# Patient Record
Sex: FEMALE | Race: WHITE | NOT HISPANIC OR LATINO | Employment: PART TIME | URBAN - METROPOLITAN AREA
[De-identification: names, ages, dates, MRNs, and addresses within clinical notes are randomized per-mention and may not be internally consistent; named-entity substitution may affect disease eponyms.]

---
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
## 2022-04-18 ENCOUNTER — TELEPHONE (OUTPATIENT)
Dept: OBGYN CLINIC | Facility: CLINIC | Age: 22
End: 2022-04-18

## 2022-04-18 NOTE — TELEPHONE ENCOUNTER
Angelica Huff left a message requesting Brand Name Elmasa Harpers 1 5/30  Spoke with [facility] staff whom confirmed, Angelica Huff has a 90 day refill remaining from 05/18/21 Rx    Informed Priscila of remaining refill and to schedule [place]  Skylar Hobson Transferred Angelica Huff to

## 2022-05-31 ENCOUNTER — TELEPHONE (OUTPATIENT)
Dept: OBGYN CLINIC | Facility: CLINIC | Age: 22
End: 2022-05-31

## 2022-05-31 DIAGNOSIS — Z30.41 ENCOUNTER FOR SURVEILLANCE OF CONTRACEPTIVE PILLS: ICD-10-CM

## 2022-05-31 RX ORDER — NORETHINDRONE ACETATE AND ETHINYL ESTRADIOL AND FERROUS FUMARATE 1.5-30(21)
1 KIT ORAL DAILY
Qty: 84 TABLET | Refills: 1 | Status: SHIPPED | OUTPATIENT
Start: 2022-05-31 | End: 2022-07-28 | Stop reason: SDUPTHER

## 2022-05-31 NOTE — TELEPHONE ENCOUNTER
[facility] called in requesting a refill on her Junel FE 1 5/30 to [facility] pharmacy in [place] on [address] [facility] [address] [facility] [address] [facility] scheduled 8/11/2022      Last seen on 4/30/2021 by Dr Stu Cartagena,    Dr Stu Cartagena,  Can you please provide courtesy refills until [facility] with Dr Nga Jackson on 8/11/2022

## 2022-07-28 ENCOUNTER — TELEPHONE (OUTPATIENT)
Dept: OBGYN CLINIC | Facility: CLINIC | Age: 22
End: 2022-07-28

## 2022-07-28 DIAGNOSIS — Z30.41 ENCOUNTER FOR SURVEILLANCE OF CONTRACEPTIVE PILLS: ICD-10-CM

## 2022-07-28 RX ORDER — NORETHINDRONE ACETATE AND ETHINYL ESTRADIOL AND FERROUS FUMARATE 1.5-30(21)
1 KIT ORAL DAILY
Qty: 84 TABLET | Refills: 0 | Status: SHIPPED | OUTPATIENT
Start: 2022-07-28

## 2022-07-28 NOTE — TELEPHONE ENCOUNTER
Received a fax from [facility] requesting a refill on Priscila's OCP Junel FE  She originally had a Well visit with Dr Brendan Spann in June but had to reschedule  She is seeing Dr Makenna Estevez on 8/11/2022  Her current pill pack will end prior to her visit on the 11th of august     She is in need of a refill to continue her medication      Last [facility] 4/2021 with Dr Brendan Spann,  Can you please refill

## 2022-08-11 ENCOUNTER — ANNUAL EXAM (OUTPATIENT)
Dept: OBGYN CLINIC | Facility: CLINIC | Age: 22
End: 2022-08-11
Payer: COMMERCIAL

## 2022-08-11 VITALS
WEIGHT: 273 LBS | BODY MASS INDEX: 41.37 KG/M2 | HEIGHT: 68 IN | SYSTOLIC BLOOD PRESSURE: 116 MMHG | DIASTOLIC BLOOD PRESSURE: 74 MMHG

## 2022-08-11 DIAGNOSIS — Z30.41 ENCOUNTER FOR SURVEILLANCE OF CONTRACEPTIVE PILLS: ICD-10-CM

## 2022-08-11 DIAGNOSIS — Z01.419 ENCNTR FOR GYN EXAM (GENERAL) (ROUTINE) W/O ABN FINDINGS: Primary | ICD-10-CM

## 2022-08-11 PROCEDURE — 99395 PREV VISIT EST AGE 18-39: CPT | Performed by: OBSTETRICS & GYNECOLOGY

## 2022-08-11 PROCEDURE — 0503F POSTPARTUM CARE VISIT: CPT | Performed by: OBSTETRICS & GYNECOLOGY

## 2022-08-11 RX ORDER — NORETHINDRONE ACETATE AND ETHINYL ESTRADIOL 1.5-30(21)
1 KIT ORAL DAILY
Qty: 84 TABLET | Refills: 3 | Status: SHIPPED | OUTPATIENT
Start: 2022-08-11 | End: 2022-11-03

## 2022-08-11 NOTE — PROGRESS NOTES
Annual Wellness Visit  [address], [address], [facility], [address]    ASSESSMENT/PLAN: Cheyanne Sousa is a 25 y o  No obstetric history on file  who presents for annual gynecologic exam     Encounter for routine gynecologic examination  - Routine well woman exam completed today  - HPV Vaccination status: Immunization series complete  - STI screening offered including HIV testing: offered, pt declined  - Contraceptive counseling discussed  Current contraception: oral contraceptives (estrogen/progesterone)  - The following were reviewed in today's visit: breast self exam    Additional problems addressed during this visit:  1  Encntr for gyn exam (general) (routine) w/o abn findings    2  Encounter for surveillance of contraceptive pills  -     norethindrone-ethinyl estradiol-iron (Junel FE 1 5/30) 1 5-30 MG-MCG tablet; Take 1 tablet by mouth daily        Next visit: 1 yr      CC:  Annual Gynecologic Examination    HPI: Cheyanne Sousa is a 25 y o  No obstetric history on file  who presents for annual gynecologic examination  Patient presents with mom for Gyn exam   Patient with light and regular menses since start of OCPs  Patient takes Advil for management of mild menstrual cramps but has not taken any Advil during current menses and wondering why she feels cramps  Desires to continue with current OCPs  Patient denies ever having been SA, declines pelvic exam but agrees to breast exam      Gyn History  Patient's last menstrual period was 08/08/2022 (exact date)  She  reports never being sexually active  OB History  No obstetric history on file  Past Medical History:  No date: Anxiety      Comment:  sees psychiatrist and therapy; on medication; severe                anxiety about health care and blood draws  No date: Eczema  No date: Hyperlipidemia      Comment:  elevated trigylcerides 8/2019 and 6/2020  Recommend                follow up  w/ pediatrican    No date: Morbid obesity ([facility] [address] )  No date: PCOS (polycystic ovarian syndrome)  No date: Prediabetes      Comment:  HgA1C 5 8% 8/2019 & 6/2020  Recom follow with                pediatrician    No date: Vitamin D deficiency     Past Surgical History:  No date: WISDOM TOOTH EXTRACTION     Family History   Problem Relation Age of Onset    Colon cancer Maternal Grandfather     Brain cancer Paternal Grandmother         Social History     Tobacco Use    Smoking status: Never Smoker    Smokeless tobacco: Never Used   Vaping Use    Vaping Use: Never used   Substance Use Topics    Alcohol use: Not Currently    Drug use: Never          Current Outpatient Medications:     azelastine (ASTELIN) 0 1 % nasal spray, 1 spray into each nostril 2 (two) times a day as needed, Disp: , Rfl:     betamethasone dipropionate (DIPROSONE) 0 05 % cream, Apply 1 application topically 2 (two) times a day, Disp: , Rfl:     cetirizine (ZyrTEC) 10 mg tablet, Take 10 mg by mouth daily, Disp: , Rfl:     cholecalciferol (VITAMIN D3) 1,000 units tablet, Take 1,000 Units by mouth daily, Disp: , Rfl:     Cholecalciferol 10 MCG (400 UNIT) CAPS, Take 1 tablet by mouth daily, Disp: , Rfl:     Clindamycin Phos-Benzoyl Perox 1 2-3 75 % GEL, Apply 1 Pump topically daily, Disp: , Rfl:     dicyclomine (BENTYL) 10 mg capsule, Take 10 mg by mouth 4 (four) times a day (before meals and at bedtime), Disp: , Rfl:     escitalopram (LEXAPRO) 20 mg tablet, Take 20 mg by mouth daily, Disp: , Rfl:     Junel FE 1 5/30 1 5-30 MG-MCG tablet, Take 1 tablet by mouth daily, Disp: 84 tablet, Rfl: 0    loperamide (IMODIUM) 2 mg capsule, Take 1 mg by mouth 2 (two) times a day, Disp: , Rfl:     metFORMIN (GLUCOPHAGE) 500 mg tablet, Take 1 tablet (500 mg total) by mouth 2 (two) times a day with meals, Disp: 180 tablet, Rfl: 4    norethindrone-ethinyl estradiol-iron (Junel FE 1 5/30) 1 5-30 MG-MCG tablet, Take 1 tablet by mouth daily, Disp: 84 tablet, Rfl: 3    She is allergic to lactose - food allergy, sulfa antibiotics, hyoscyamine, and insect extract allergy skin test     ROS negative except as noted in HPI    Objective:  /74 (BP Location: Right arm, Patient Position: Sitting, Cuff Size: Large)   Ht 5' 8" (1 727 m)   Wt 124 kg (273 lb)   LMP 08/08/2022 (Exact Date)   BMI 41 51 kg/m²      Physical Exam  Constitutional:       Appearance: Normal appearance  HENT:      Head: Normocephalic  Chest:   Breasts:      Right: No inverted nipple, mass, nipple discharge, skin change, tenderness or axillary adenopathy  Left: No inverted nipple, mass, nipple discharge, skin change, tenderness or axillary adenopathy  Lymphadenopathy:      Upper Body:      Right upper body: No axillary adenopathy  Left upper body: No axillary adenopathy  Neurological:      General: No focal deficit present  Mental Status: She is alert

## 2023-07-05 ENCOUNTER — ANNUAL EXAM (OUTPATIENT)
Dept: OBGYN CLINIC | Facility: CLINIC | Age: 23
End: 2023-07-05
Payer: COMMERCIAL

## 2023-07-05 VITALS
DIASTOLIC BLOOD PRESSURE: 84 MMHG | HEIGHT: 68 IN | WEIGHT: 293 LBS | BODY MASS INDEX: 44.41 KG/M2 | SYSTOLIC BLOOD PRESSURE: 124 MMHG

## 2023-07-05 DIAGNOSIS — Z30.41 ENCOUNTER FOR SURVEILLANCE OF CONTRACEPTIVE PILLS: ICD-10-CM

## 2023-07-05 DIAGNOSIS — E28.2 PCOS (POLYCYSTIC OVARIAN SYNDROME): ICD-10-CM

## 2023-07-05 DIAGNOSIS — Z01.419 ENCOUNTER FOR ANNUAL ROUTINE GYNECOLOGICAL EXAMINATION: Primary | ICD-10-CM

## 2023-07-05 PROCEDURE — S0612 ANNUAL GYNECOLOGICAL EXAMINA: HCPCS | Performed by: OBSTETRICS & GYNECOLOGY

## 2023-07-05 RX ORDER — NORETHINDRONE ACETATE AND ETHINYL ESTRADIOL 1.5-30(21)
1 KIT ORAL DAILY
Qty: 84 TABLET | Refills: 4 | Status: SHIPPED | OUTPATIENT
Start: 2023-07-05 | End: 2023-09-27

## 2023-07-05 RX ORDER — ATOMOXETINE 40 MG/1
40 CAPSULE ORAL DAILY
COMMUNITY
Start: 2023-06-20

## 2023-07-05 RX ORDER — CHOLECALCIFEROL (VITAMIN D3) 125 MCG
5 CAPSULE ORAL
COMMUNITY

## 2023-07-05 RX ORDER — HYDROXYZINE HYDROCHLORIDE 10 MG/1
10 TABLET, FILM COATED ORAL DAILY PRN
COMMUNITY
Start: 2023-06-16

## 2023-07-05 RX ORDER — BUSPIRONE HYDROCHLORIDE 15 MG/1
15 TABLET ORAL 2 TIMES DAILY
COMMUNITY
Start: 2023-06-14

## 2023-07-05 RX ORDER — CLOBETASOL PROPIONATE 0.5 MG/G
CREAM TOPICAL
COMMUNITY

## 2023-07-05 RX ORDER — METFORMIN HYDROCHLORIDE 500 MG/1
500 TABLET, EXTENDED RELEASE ORAL DAILY
COMMUNITY
Start: 2023-05-15

## 2023-07-05 RX ORDER — MIDODRINE HYDROCHLORIDE 2.5 MG/1
2.5 TABLET ORAL
COMMUNITY

## 2023-07-05 RX ORDER — IPRATROPIUM BROMIDE 21 UG/1
2 SPRAY, METERED NASAL EVERY 12 HOURS
COMMUNITY
Start: 2022-11-27 | End: 2023-11-27

## 2023-07-05 RX ORDER — CLINDAMYCIN PHOSPHATE 10 MG/G
GEL TOPICAL DAILY
COMMUNITY

## 2023-07-05 NOTE — ASSESSMENT & PLAN NOTE
Regular periods on OCPs. Endocrinology has on Metformin - last HgA1C 5.7% - borderline prediabetes. Encouraged to following with endocrine. Continue OCPs.

## 2023-07-05 NOTE — PROGRESS NOTES
Annual Wellness Visit  [address]   [address] [facility], [address], [place] [address]    ASSESSMENT/PLAN: Alyssa rBaga is a 23 y.o. No obstetric history on file. who presents for annual gynecologic exam.    Encounter for routine gynecologic examination  - Routine well woman exam completed today. - HPV Vaccination status: Immunization series complete  - STI screening offered including HIV testing: declined - has not been sexually active. - Contraceptive counseling discussed. Current contraception: oral contraceptives (estrogen/progesterone),   - The following were reviewed in today's visit: use and side effects of OCPs, exercise and healthy diet    Additional problems addressed during this visit:  1. Encounter for annual routine gynecological examination    2. Encounter for surveillance of contraceptive pills  A/P:   No contraindication to estrogen/progesterone birth control use identified. Reviewed that only condoms protect against STIs. Refilled for 1 year. -     norethindrone-ethinyl estradiol-iron (Junel FE 1.5/30) 1.5-30 MG-MCG tablet; Take 1 tablet by mouth daily    3. PCOS (polycystic ovarian syndrome)  Assessment & Plan:  Regular periods on OCPs. Endocrinology has on Metformin - last HgA1C 5.7% - borderline prediabetes. Encouraged to following with endocrine. Continue OCPs. Next visit: 1 year Wellness      CC:  Annual Gynecologic Examination    HPI: Alyssa Braga is a 23 y.o. No obstetric history on file. who presents for annual gynecologic examination. She denies any breast, urinary or pelvic issues at today's visit. On OCPs for PCOS. Gets monthly periods. Has not previously been sexually active. Just recently dx with sucrose intolerance by GI - this likely is cause of chronic diarrhea. Gyn History  Patient's last menstrual period was 2023. She  reports never being sexually active. OB History      Past Medical History:  No date:  Anxiety Comment:  sees psychiatrist and therapy; on medication; severe                anxiety about health care and blood draws  No date: Chronic diarrhea      Comment:  follows GI  No date: Eczema  No date: Hyperlipidemia      Comment:  elevated trigylcerides 8/2019 and 6/2020. Recommend                follow up  w/ pediatrican. 2010: Migraine      Comment:  no auras  No date: Morbid obesity ([address])  No date: PCOS (polycystic ovarian syndrome)  No date: Prediabetes      Comment:  HgA1C 5.8% 8/2019 & 6/2020.   Recom follow with                pediatrician.  07/2023: Sucrose intolerance      Comment:  diagnosed by GI after years of chronic diarrhea  No date: Vitamin D deficiency     Past Surgical History:  No date: WISDOM TOOTH EXTRACTION     Family History   Problem Relation Age of Onset   • Colon cancer Maternal Grandfather    • Heart disease Maternal Grandfather    • Brain cancer Paternal Grandmother    • Miscarriages / Stillbirths Paternal Grandmother    • Migraines Mother    • Rashes / Skin problems Mother    • Cancer Maternal Grandmother    • Miscarriages / Stillbirths Maternal Grandmother    • Osteoporosis Maternal Grandmother         Social History     Tobacco Use   • Smoking status: Never     Passive exposure: Never   • Smokeless tobacco: Never   Vaping Use   • Vaping Use: Never used   Substance Use Topics   • Alcohol use: Not Currently   • Drug use: Never          Current Outpatient Medications:   •  atoMOXetine (STRATTERA) 40 mg capsule, Take 40 mg by mouth daily, Disp: , Rfl:   •  azelastine (ASTELIN) 0.1 % nasal spray, 1 spray into each nostril 2 (two) times a day as needed, Disp: , Rfl:   •  betamethasone dipropionate (DIPROSONE) 0.05 % cream, Apply 1 application topically 2 (two) times a day, Disp: , Rfl:   •  busPIRone (BUSPAR) 15 mg tablet, Take 15 mg by mouth 2 (two) times a day, Disp: , Rfl:   •  cetirizine (ZyrTEC) 10 mg tablet, Take 10 mg by mouth daily, Disp: , Rfl:   •  cholecalciferol (VITAMIN D3) 1,000 units tablet, Take 1,000 Units by mouth daily, Disp: , Rfl:   •  clindamycin (CLINDAGEL) 1 % gel, Apply topically daily, Disp: , Rfl:   •  dicyclomine (BENTYL) 10 mg capsule, Take 20 mg by mouth 2 (two) times a day, Disp: , Rfl:   •  escitalopram (LEXAPRO) 20 mg tablet, Take 10 mg by mouth daily, Disp: , Rfl:   •  ipratropium (ATROVENT) 0.03 % nasal spray, 2 sprays into each nostril every 12 (twelve) hours, Disp: , Rfl:   •  loperamide (IMODIUM) 2 mg capsule, Take 1 mg by mouth 2 (two) times a day, Disp: , Rfl:   •  Melatonin 5 MG TABS, Take 5 mg by mouth, Disp: , Rfl:   •  metFORMIN (GLUCOPHAGE-XR) 500 mg 24 hr tablet, Take 500 mg by mouth in the morning, Disp: , Rfl:   •  norethindrone-ethinyl estradiol-iron (Junel FE 1.5/30) 1.5-30 MG-MCG tablet, Take 1 tablet by mouth daily, Disp: 84 tablet, Rfl: 4  •  Clobetasol Prop Emollient Base 0.05 % emollient cream, Apply topically, Disp: , Rfl:   •  hydrOXYzine HCL (ATARAX) 10 mg tablet, Take 10 mg by mouth daily as needed, Disp: , Rfl:   •  midodrine (PROAMATINE) 2.5 mg tablet, Take 2.5 mg by mouth 3 (three) times a day before meals, Disp: , Rfl:     She is allergic to lactose - food allergy, norethin ace-eth estrad-fe, sulfa antibiotics, amoxicillin, hyoscyamine, and insect extract allergy skin test..    ROS negative except as noted in HPI    Objective:  /84 (BP Location: Left arm, Patient Position: Sitting, Cuff Size: Standard)   Ht 5' 8" (1.727 m)   Wt 135 kg (297 lb 12.8 oz)   LMP 06/12/2023   BMI 45.28 kg/m²      Physical Exam  Constitutional:       Appearance: Normal appearance. Chest:   Breasts:     Right: No mass or tenderness. Left: Normal. No mass or tenderness. Genitourinary:     Comments: Declined exam, not sexually active  Neurological:      Mental Status: She is alert and oriented to person, place, and time.    Psychiatric:         Behavior: Behavior normal.

## 2023-07-05 NOTE — PATIENT INSTRUCTIONS
- Maintain healthy weight with BMI ideally between 18-25.    - Eat a healthy diet, including multiple servings of vegetables and fruits, as well as lean protein sources. - Get at least 150 minutes of moderate aerobic activity or 75 minutes of vigorous aerobic activity a week, or a combination of moderate and vigorous activity. Greater amounts of exercise will provide an even greater health benefit. - Ensure diet provides 1200mg of Calcium daily (divided) and 800IU of vitamin D, or take supplements to meet this. - Safe sex practices recommended. - Resources - information on birth control and sexually transmitted infections - www.bedsider. org            - information on sexually transmitted infections - www.cdc.gov/std/

## 2023-07-05 NOTE — LETTER
July 5, 2023     Jami Weir MD  [address]  [address] [facility]    Patient: Heidi Ragsdale   YOB: 2000   Date of Visit: 7/5/2023       Dear Dr. Raymond Mercury:    Thank you for referring Heidi Ragsdale to me for evaluation. Below are my notes for this consultation. If you have questions, please do not hesitate to call me. I look forward to following your patient along with you. Sincerely,        Darius Diehl MD        CC: No Recipients    Darius Diehl MD  7/5/2023  6:05 PM  Sign when Signing Visit  Annual [address]  [address] [facility], [address], [facility], [address] [facility],[address]    ASSESSMENT/PLAN: Heidi Ragsdale is a 23 y.o. No obstetric history on file. who presents for annual gynecologic exam.    Encounter for routine gynecologic examination  - Routine well woman exam completed today. - HPV Vaccination status: Immunization series complete  - STI screening offered including HIV testing: declined - has not been sexually active. - Contraceptive counseling discussed. Current contraception: oral contraceptives (estrogen/progesterone),   - The following were reviewed in today's visit: use and side effects of OCPs, exercise and healthy diet    Additional problems addressed during this visit:  1. Encounter for annual routine gynecological examination    2. Encounter for surveillance of contraceptive pills  A/P:   No contraindication to estrogen/progesterone birth control use identified. Reviewed that only condoms protect against STIs. Refilled for 1 year. -     norethindrone-ethinyl estradiol-iron (Junel FE 1.5/30) 1.5-30 MG-MCG tablet; Take 1 tablet by mouth daily    3. PCOS (polycystic ovarian syndrome)  Assessment & Plan:  Regular periods on OCPs. Endocrinology has on Metformin - last HgA1C 5.7% - borderline prediabetes. Encouraged to following with endocrine. Continue OCPs.         Next visit: 1 year Wellness      CC:  Annual Gynecologic Examination    HPI: Latoya Shi is a 23 y.o. No obstetric history on file. who presents for annual gynecologic examination. She denies any breast, urinary or pelvic issues at today's visit. On OCPs for PCOS. Gets monthly periods. Has not previously been sexually active. Just recently dx with sucrose intolerance by GI - this likely is cause of chronic diarrhea. Gyn History  Patient's last menstrual period was 2023. She  reports never being sexually active. OB History      Past Medical History:  No date: Anxiety      Comment:  sees psychiatrist and therapy; on medication; severe                anxiety about health care and blood draws  No date: Chronic diarrhea      Comment:  follows GI  No date: Eczema  No date: Hyperlipidemia      Comment:  elevated trigylcerides 2019 and 2020. Recommend                follow up  w/ pediatrican. : Migraine      Comment:  no auras  No date: Morbid obesity ([address])  No date: PCOS (polycystic ovarian syndrome)  No date: Prediabetes      Comment:  HgA1C 5.8% 2019 & 2020.   Recom follow with                pediatrician.  2023: Sucrose intolerance      Comment:  diagnosed by GI after years of chronic diarrhea  No date: Vitamin D deficiency     Past Surgical History:  No date: WISDOM TOOTH EXTRACTION     Family History   Problem Relation Age of Onset   • Colon cancer Maternal Grandfather    • Heart disease Maternal Grandfather    • Brain cancer Paternal Grandmother    • Miscarriages / Stillbirths Paternal Grandmother    • Migraines Mother    • Rashes / Skin problems Mother    • Cancer Maternal Grandmother    • Miscarriages / Stillbirths Maternal Grandmother    • Osteoporosis Maternal Grandmother         Social History     Tobacco Use   • Smoking status: Never     Passive exposure: Never   • Smokeless tobacco: Never   Vaping Use   • Vaping Use: Never used   Substance Use Topics   • Alcohol use: Not Currently   • Drug use: Never Current Outpatient Medications:   •  atoMOXetine (STRATTERA) 40 mg capsule, Take 40 mg by mouth daily, Disp: , Rfl:   •  azelastine (ASTELIN) 0.1 % nasal spray, 1 spray into each nostril 2 (two) times a day as needed, Disp: , Rfl:   •  betamethasone dipropionate (DIPROSONE) 0.05 % cream, Apply 1 application topically 2 (two) times a day, Disp: , Rfl:   •  busPIRone (BUSPAR) 15 mg tablet, Take 15 mg by mouth 2 (two) times a day, Disp: , Rfl:   •  cetirizine (ZyrTEC) 10 mg tablet, Take 10 mg by mouth daily, Disp: , Rfl:   •  cholecalciferol (VITAMIN D3) 1,000 units tablet, Take 1,000 Units by mouth daily, Disp: , Rfl:   •  clindamycin (CLINDAGEL) 1 % gel, Apply topically daily, Disp: , Rfl:   •  dicyclomine (BENTYL) 10 mg capsule, Take 20 mg by mouth 2 (two) times a day, Disp: , Rfl:   •  escitalopram (LEXAPRO) 20 mg tablet, Take 10 mg by mouth daily, Disp: , Rfl:   •  ipratropium (ATROVENT) 0.03 % nasal spray, 2 sprays into each nostril every 12 (twelve) hours, Disp: , Rfl:   •  loperamide (IMODIUM) 2 mg capsule, Take 1 mg by mouth 2 (two) times a day, Disp: , Rfl:   •  Melatonin 5 MG TABS, Take 5 mg by mouth, Disp: , Rfl:   •  metFORMIN (GLUCOPHAGE-XR) 500 mg 24 hr tablet, Take 500 mg by mouth in the morning, Disp: , Rfl:   •  norethindrone-ethinyl estradiol-iron (Junel FE 1.5/30) 1.5-30 MG-MCG tablet, Take 1 tablet by mouth daily, Disp: 84 tablet, Rfl: 4  •  Clobetasol Prop Emollient Base 0.05 % emollient cream, Apply topically, Disp: , Rfl:   •  hydrOXYzine HCL (ATARAX) 10 mg tablet, Take 10 mg by mouth daily as needed, Disp: , Rfl:   •  midodrine (PROAMATINE) 2.5 mg tablet, Take 2.5 mg by mouth 3 (three) times a day before meals, Disp: , Rfl:     She is allergic to lactose - food allergy, norethin ace-eth estrad-fe, sulfa antibiotics, amoxicillin, hyoscyamine, and insect extract allergy skin test..    ROS negative except as noted in HPI    Objective:  /84 (BP Location: Left arm, Patient Position: Sitting, Cuff Size: Standard)   Ht 5' 8" (1.727 m)   Wt 135 kg (297 lb 12.8 oz)   LMP 06/12/2023   BMI 45.28 kg/m²     Physical Exam  Constitutional:       Appearance: Normal appearance. Chest:   Breasts:     Right: No mass or tenderness. Left: Normal. No mass or tenderness. Genitourinary:     Comments: Declined exam, not sexually active  Neurological:      Mental Status: She is alert and oriented to person, place, and time.    Psychiatric:         Behavior: Behavior normal.

## 2023-12-29 ENCOUNTER — TELEPHONE (OUTPATIENT)
Dept: OBGYN CLINIC | Facility: CLINIC | Age: 23
End: 2023-12-29

## 2023-12-29 NOTE — TELEPHONE ENCOUNTER
Patient is requesting we use mother's phone number to contact her as on file.      Patient is stating she went to her pharmacy and they are giving her a different type of ocp other than Junel Fe.      Spoke with pharmacy rep and she states script on file is still valid and that it is the same pill but just different manufactures.  Spoke with patient's mother and informed her that she will have to request for the same name-brand ocp at a different [facility] if wanting a specific brand.  Patient's mother verbalized understanding and voiced appreciation.

## 2024-01-09 ENCOUNTER — TELEPHONE (OUTPATIENT)
Dept: OBGYN CLINIC | Facility: CLINIC | Age: 24
End: 2024-01-09

## 2024-01-09 DIAGNOSIS — E28.2 PCOS (POLYCYSTIC OVARIAN SYNDROME): Primary | ICD-10-CM

## 2024-01-09 RX ORDER — NORETHINDRONE ACETATE AND ETHINYL ESTRADIOL 1.5-30(21)
1 KIT ORAL DAILY
Qty: 84 TABLET | Refills: 2 | Status: SHIPPED | OUTPATIENT
Start: 2024-01-09

## 2024-01-09 NOTE — TELEPHONE ENCOUNTER
Ruth, Priscila's mother called that Priscila picked & took generic brand Junel Fe from [facility],[place]. Priscila is Lactose intolerant. This particular generic Junel Fe had increased amount of lactose causing Priscila to have a lactose reaction. Priscila stopped taking pills and feeling better. Priscila is not sexually active per Ruth. Ruth is requesting to get a new Junel Fe Rx sent to [facility] pharmacy,[place]. The [facility] pharmacy,has given pt Ariella Fe in the past, which Priscila took without any reaction. Ruth will mail medication to Priscila. Message sent to Dr. Mohan due to Dr. Roque is on vacation.

## 2024-01-16 ENCOUNTER — TELEPHONE (OUTPATIENT)
Dept: OBGYN CLINIC | Facility: CLINIC | Age: 24
End: 2024-01-16

## 2024-01-16 NOTE — TELEPHONE ENCOUNTER
Priscila left a message-has some concerns about her birth control pills. She is currently taking Ariella Fe 1.5/30.   She was taking a different generic Junel Fe pill from a [facility],[place]. Priscila is Lactose intolerant and she was having a reaction with this particular pill. She was mid-pack when stopped pills. She then started taking Ariella Fe mid cycle.  She admits to miss taking a pill on Thursday 01/11;didn't know if active or placebo pill.  Priscila started a new pill pack on Friday 01/12.  Today, she's bleeding and changing a Maxi pad approx. 5-6 times/day. Informed to take missed pill, monitor bleeding, call back if changing a pad less than every hour.  Reassured her about her body is trying to adjust to the taking of pills mid-cycle.

## 2024-05-16 ENCOUNTER — NURSE TRIAGE (OUTPATIENT)
Age: 24
End: 2024-05-16

## 2024-05-16 NOTE — TELEPHONE ENCOUNTER
"Spoke with patient regarding a lump noted on the left side of her vagina that became painful yesterday.  She reports she believed they were pimples that come and go but would like them to be checked.  She denies any discharge or odor.  She reports 4/10 pain with general activities. Appointment made for tomorrow.  Her annual also scheduled at this time.    Reason for Disposition   Patient wants to be seen    Answer Assessment - Initial Assessment Questions  1. SYMPTOM: \"What's the main symptom you're concerned about?\" (e.g., pain, itching, dryness)      Lump noted on left side of vagina  3. ONSET: \"When did the  lump  start?\"      Yesterday became painful  4. PAIN: \"Is there any pain?\" If Yes, ask: \"How bad is it?\" (Scale: 1-10; mild, moderate, severe)      4/10  5. ITCHING: \"Is there any itching?\" If Yes, ask: \"How bad is it?\" (Scale: 1-10; mild, moderate, severe)      no  6. CAUSE: \"What do you think is causing the discharge?\" \"Have you had the same problem before? What happened then?\"      no  7. OTHER SYMPTOMS: \"Do you have any other symptoms?\" (e.g., fever, itching, vaginal bleeding, pain with urination, injury to genital area, vaginal foreign body)      denies  8. PREGNANCY: \"Is there any chance you are pregnant?\" \"When was your last menstrual period?\"      4/25/24    Protocols used: Vaginal Symptoms-ADULT-[place]    "

## 2024-05-17 ENCOUNTER — OFFICE VISIT (OUTPATIENT)
Dept: OBGYN CLINIC | Facility: CLINIC | Age: 24
End: 2024-05-17
Payer: COMMERCIAL

## 2024-05-17 VITALS
SYSTOLIC BLOOD PRESSURE: 110 MMHG | HEIGHT: 68 IN | BODY MASS INDEX: 44.41 KG/M2 | WEIGHT: 293 LBS | DIASTOLIC BLOOD PRESSURE: 68 MMHG

## 2024-05-17 DIAGNOSIS — N90.89 LABIAL LESION: Primary | ICD-10-CM

## 2024-05-17 PROCEDURE — 99212 OFFICE O/P EST SF 10 MIN: CPT | Performed by: OBSTETRICS & GYNECOLOGY

## 2024-05-17 NOTE — PROGRESS NOTES
"[facility] [place]  [address], [address], [place] [address]    Assessment/Plan:  1. Labial lesion  Comments:  patient c/o intermittent labia bumps.  One yesterday drained.  A/P:   Exam essentially normal today - likely had labial folliculitis which resolved when drained.  Discussed likely what she is describing is folliculitis.  Recom if recurs warm compresses and triple antibiotic ointment to speed healing.  If gets large and painful even with treatment return for exam.    On exam no evidence of perineal scarring which would indicate possible hidradenitis.  No evidence of scarring.      Subjective:   Priscila Templeton is a 23 y.o.  female.    HPI:   Priscila present c/o bumps around labia every few months.  Most recently yesterday.    She states this has been going on for about 1 year.  They are external on labia, not internal.   Usually last a couple weeks and then resolve.  Rarely drain or pop but this one from yesterday did.  She describes it as a \"pimple\" and states its the size of the top of her finger (~1cm or less).    She has not been sexually active in the past.        Gyn History  Patient's last menstrual period was 2024 (exact date).       Last pap smear: Not on file    She  reports never being sexually active.       OB History      Past Medical History:  No date: Anxiety      Comment:  sees psychiatrist and therapy; on medication; severe                anxiety about health care and blood draws  No date: Chronic diarrhea      Comment:  follows GI  No date: Eczema  No date: Hyperlipidemia      Comment:  elevated trigylcerides 2019 and 2020.  Recommend                follow up  w/ pediatrican.  2010: Migraine      Comment:  no auras  No date: Morbid obesity (HCC)  No date: PCOS (polycystic ovarian syndrome)  No date: Prediabetes      Comment:  seeing endocrinology  2023: Sucrose intolerance      Comment:  diagnosed by GI after years of chronic diarrhea  No " date: Sucrose intolerance  No date: Vitamin D deficiency     Past Surgical History:  No date: WISDOM TOOTH EXTRACTION     Social History     Tobacco Use    Smoking status: Never     Passive exposure: Never    Smokeless tobacco: Never   Vaping Use    Vaping status: Never Used   Substance Use Topics    Alcohol use: Yes     Comment: occasional    Drug use: Never          Current Outpatient Medications:     atoMOXetine (STRATTERA) 40 mg capsule, Take 50 mg by mouth daily, Disp: , Rfl:     azelastine (ASTELIN) 0.1 % nasal spray, 1 spray into each nostril 2 (two) times a day as needed, Disp: , Rfl:     betamethasone dipropionate (DIPROSONE) 0.05 % cream, Apply 1 application topically 2 (two) times a day, Disp: , Rfl:     busPIRone (BUSPAR) 15 mg tablet, Take 15 mg by mouth 2 (two) times a day, Disp: , Rfl:     cetirizine (ZyrTEC) 10 mg tablet, Take 10 mg by mouth daily, Disp: , Rfl:     cholecalciferol (VITAMIN D3) 1,000 units tablet, Take 1,000 Units by mouth daily, Disp: , Rfl:     clindamycin (CLINDAGEL) 1 % gel, Apply topically daily, Disp: , Rfl:     Clobetasol Prop Emollient Base 0.05 % emollient cream, Apply topically, Disp: , Rfl:     dicyclomine (BENTYL) 10 mg capsule, Take 20 mg by mouth daily as needed, Disp: , Rfl:     escitalopram (LEXAPRO) 20 mg tablet, Take 10 mg by mouth daily, Disp: , Rfl:     hydrOXYzine HCL (ATARAX) 10 mg tablet, Take 10 mg by mouth daily as needed, Disp: , Rfl:     loperamide (IMODIUM) 2 mg capsule, Take 1 mg by mouth if needed, Disp: , Rfl:     Melatonin 5 MG TABS, Take 5 mg by mouth, Disp: , Rfl:     metFORMIN (GLUCOPHAGE-XR) 500 mg 24 hr tablet, Take 500 mg by mouth 2 (two) times a day, Disp: , Rfl:     midodrine (PROAMATINE) 2.5 mg tablet, Take 2.5 mg by mouth 3 (three) times a day before meals, Disp: , Rfl:     norethindrone-ethinyl estradiol-iron (Ariella FE 1.5/30) 1.5-30 MG-MCG tablet, Take 1 tablet by mouth daily, Disp: 84 tablet, Rfl: 2    Riboflavin (VITAMIN B2 PO), Take 200  "mg by mouth in the morning, Disp: , Rfl:     Sacrosidase (SUCRAID PO), Take by mouth, Disp: , Rfl:     ipratropium (ATROVENT) 0.03 % nasal spray, 2 sprays into each nostril every 12 (twelve) hours, Disp: , Rfl:     She is allergic to lactose - food allergy, norethin ace-eth estrad-fe, sucrase, sulfa antibiotics, amoxicillin, hyoscyamine, and insect extract..    ROS: Review of Systems   Constitutional: Negative.    Gastrointestinal: Negative.    Genitourinary:  Positive for vaginal pain (bump). Negative for dysuria and pelvic pain.   Psychiatric/Behavioral: Negative.         Objective:  /68   Ht 5' 7.75\" (1.721 m)   Wt 134 kg (295 lb 3.2 oz)   LMP 04/25/2024 (Exact Date)   Breastfeeding No   BMI 45.22 kg/m²      Physical Exam  Constitutional:       Appearance: Normal appearance.   Genitourinary:     General: Normal vulva.      Labia:         Right: No rash, tenderness, lesion or injury.         Left: No rash, tenderness, lesion or injury.       Comments: Patient localizes bump to inner left labia majora - I see no lesions, bumps, drainage - although maybe some mild diffuse edema in left compared to right.  Patient states after drained yesterday the bump went down.  Neurological:      Mental Status: She is alert.   Psychiatric:         Mood and Affect: Mood normal.         Behavior: Behavior normal.         "

## 2024-07-26 ENCOUNTER — ANNUAL EXAM (OUTPATIENT)
Dept: OBGYN CLINIC | Facility: CLINIC | Age: 24
End: 2024-07-26
Payer: COMMERCIAL

## 2024-07-26 VITALS
HEIGHT: 68 IN | WEIGHT: 293 LBS | SYSTOLIC BLOOD PRESSURE: 124 MMHG | BODY MASS INDEX: 44.41 KG/M2 | DIASTOLIC BLOOD PRESSURE: 84 MMHG

## 2024-07-26 DIAGNOSIS — R10.2 PELVIC CRAMPING: ICD-10-CM

## 2024-07-26 DIAGNOSIS — Z86.39 HISTORY OF HYPERCHOLESTEROLEMIA: ICD-10-CM

## 2024-07-26 DIAGNOSIS — E28.2 PCOS (POLYCYSTIC OVARIAN SYNDROME): ICD-10-CM

## 2024-07-26 DIAGNOSIS — Z01.419 ENCOUNTER FOR ANNUAL ROUTINE GYNECOLOGICAL EXAMINATION: Primary | ICD-10-CM

## 2024-07-26 DIAGNOSIS — Z12.4 CERVICAL CANCER SCREENING: ICD-10-CM

## 2024-07-26 PROCEDURE — S0612 ANNUAL GYNECOLOGICAL EXAMINA: HCPCS | Performed by: OBSTETRICS & GYNECOLOGY

## 2024-07-26 RX ORDER — ECHINACEA PURPUREA EXTRACT 125 MG
1 TABLET ORAL
COMMUNITY
Start: 2023-11-06 | End: 2024-11-05

## 2024-07-26 RX ORDER — ALBUTEROL SULFATE 90 UG/1
AEROSOL, METERED RESPIRATORY (INHALATION)
COMMUNITY
End: 2024-07-26

## 2024-07-26 RX ORDER — NORETHINDRONE ACETATE AND ETHINYL ESTRADIOL 1.5-30(21)
1 KIT ORAL DAILY
Qty: 84 TABLET | Refills: 2 | Status: SHIPPED | OUTPATIENT
Start: 2024-07-26

## 2024-07-26 NOTE — PROGRESS NOTES
Annual Wellness Visit  [facility] - [address], [address], [place] [address]    ASSESSMENT/PLAN: Priscila Templeton is a 23 y.o.  who presents for annual gynecologic exam.    Encounter for routine gynecologic examination  - Routine well woman exam completed today.  - Cervical Cancer Screening: Current ASCCP Guidelines reviewed. Last Pap: not done yet.   Next Pap Due: today.  - HPV Vaccination status: Immunization series complete  - STI screening offered including HIV testing: declined, has not been sexually active  - Contraceptive counseling discussed.  Current contraception: oral contraceptives (estrogen/progesterone), for PCOS  - The following were reviewed in today's visit: use and side effects of OCPs, exercise, and healthy diet    Additional problems addressed during this visit:  1. Encounter for annual routine gynecological examination  2. Cervical cancer screening  -     Thinprep Tis Pap Reflex HPV mRNA E6/E7  3. PCOS (polycystic ovarian syndrome)  Assessment & Plan:  Continues on OCPs from our office and Metformin from endocrinology.  2023 HgA1c normal.  Cholesterol not checked in couple years, h/o elevated in past.  Order provided.  Orders:  -     Lipid Panel with Direct LDL reflex; Future  -     Lipid Panel with Direct LDL reflex  -     ABO/Rh; Future  -     ABO/Rh  -     norethindrone-ethinyl estradiol-iron (Ariella FE 1.530) 1.5-30 MG-MCG tablet; Take 1 tablet by mouth daily  4. History of hypercholesterolemia  Assessment & Plan:   elevated triglycerides.  Not checked since then.   2024 order provided  Orders:  -     Lipid Panel with Direct LDL reflex; Future  -     Lipid Panel with Direct LDL reflex  5. Pelvic cramping  Comments:  cramping between periods. Ordered transabdominal and transvaginal u/s - pt aware she can declined transvaginal if she is uncomfortable but may be less accurate.  Orders:  -     US pelvis complete w transvaginal; Future      Next visit:  1 year Wellness      CC:  Annual Gynecologic Examination    HPI: Priscila Templeton is a 23 y.o.  who presents for annual gynecologic examination.  She denies any breast, urinary or pelvic issues at today's visit.    On OCPs for PCOS.  Getting monthly periods on withdrawal week.  Has not been sexually active in the past.  C/o period like cramps (but lighter) but not during period - for last few months.  About daily.  Not associated with bowel or bladder function.      Extremely nervous today about pelvic exam and pap smear.  Patient's mother with her in room providing support.        Gyn History  Patient's last menstrual period was 2024.     Last Pap: not done yet    She  reports never being sexually active.       OB History      Past Medical History:  No date: Anxiety      Comment:  sees psychiatrist and therapy; on medication; severe                anxiety about health care and blood draws  No date: Eczema  No date: Hyperlipidemia      Comment:  elevated trigylcerides 2019 and 2020.  Recommend                follow up  w/ pediatrican.  2010: Migraine      Comment:  no auras  No date: Morbid obesity (HCC)  No date: PCOS (polycystic ovarian syndrome)  No date: Prediabetes      Comment:  seeing endocrinology  2023: Sucrose intolerance      Comment:  diagnosed by GI after years of chronic diarrhea  No date: Vitamin D deficiency     Past Surgical History:  No date: WISDOM TOOTH EXTRACTION     Family History   Problem Relation Age of Onset    Colon cancer Maternal Grandfather     Heart disease Maternal Grandfather     Brain cancer Paternal Grandmother     Miscarriages / Stillbirths Paternal Grandmother     Migraines Mother     Rashes / Skin problems Mother     Cancer Maternal Grandmother     Miscarriages / Stillbirths Maternal Grandmother     Osteoporosis Maternal Grandmother         Social History     Tobacco Use    Smoking status: Never     Passive exposure: Never    Smokeless tobacco: Never    Vaping Use    Vaping status: Never Used   Substance Use Topics    Alcohol use: Yes     Comment: occasional    Drug use: Never          Current Outpatient Medications:     atoMOXetine (STRATTERA) 40 mg capsule, Take 50 mg by mouth daily, Disp: , Rfl:     betamethasone dipropionate (DIPROSONE) 0.05 % cream, Apply 1 application topically 2 (two) times a day, Disp: , Rfl:     busPIRone (BUSPAR) 15 mg tablet, Take 15 mg by mouth 2 (two) times a day, Disp: , Rfl:     cetirizine (ZyrTEC) 10 mg tablet, Take 10 mg by mouth daily, Disp: , Rfl:     cholecalciferol (VITAMIN D3) 1,000 units tablet, Take 1,000 Units by mouth daily, Disp: , Rfl:     clindamycin (CLINDAGEL) 1 % gel, Apply topically daily, Disp: , Rfl:     Clobetasol Prop Emollient Base 0.05 % emollient cream, Apply topically, Disp: , Rfl:     dicyclomine (BENTYL) 10 mg capsule, Take 20 mg by mouth daily as needed, Disp: , Rfl:     escitalopram (LEXAPRO) 20 mg tablet, Take 10 mg by mouth daily, Disp: , Rfl:     hydrOXYzine HCL (ATARAX) 10 mg tablet, Take 10 mg by mouth daily as needed, Disp: , Rfl:     loperamide (IMODIUM) 2 mg capsule, Take 1 mg by mouth if needed, Disp: , Rfl:     Melatonin 5 MG TABS, Take 5 mg by mouth, Disp: , Rfl:     metFORMIN (GLUCOPHAGE-XR) 500 mg 24 hr tablet, Take 500 mg by mouth 2 (two) times a day, Disp: , Rfl:     midodrine (PROAMATINE) 2.5 mg tablet, Take 2.5 mg by mouth 3 (three) times a day before meals, Disp: , Rfl:     norethindrone-ethinyl estradiol-iron (Ariella FE 1.5/30) 1.5-30 MG-MCG tablet, Take 1 tablet by mouth daily, Disp: 84 tablet, Rfl: 2    Riboflavin (VITAMIN B2 PO), Take 200 mg by mouth in the morning, Disp: , Rfl:     Sacrosidase (SUCRAID PO), Take by mouth, Disp: , Rfl:     sodium chloride (OCEAN) 0.65 % nasal spray, 1 spray into each nostril, Disp: , Rfl:     She is allergic to other, lactose - food allergy, norethin ace-eth estrad-fe, sucrase, sulfa antibiotics, wild cherry syrup - food allergy, amoxicillin,  "hyoscyamine, and insect extract..    ROS negative except as noted in HPI    Objective:  /84 (BP Location: Left arm, Patient Position: Sitting, Cuff Size: Standard)   Ht 5' 7.75\" (1.721 m)   Wt (!) 137 kg (303 lb)   LMP 07/22/2024   BMI 46.41 kg/m²      Physical Exam  Constitutional:       Appearance: Normal appearance.   Chest:   Breasts:     Right: Normal. No mass or tenderness.      Left: Normal. No mass or tenderness.   Abdominal:      Palpations: Abdomen is soft.      Tenderness: There is no abdominal tenderness.   Genitourinary:     General: Normal vulva.      Vagina: No bleeding or lesions.      Cervix: Normal.      Uterus: Normal. Not tender.       Adnexa:         Right: No mass or tenderness.          Left: No mass or tenderness.        Rectum: No external hemorrhoid.      Comments: Blind pap done due to discomfort with speculum, cervix not seen but palpated normally  Musculoskeletal:         General: Normal range of motion.   Lymphadenopathy:      Upper Body:      Right upper body: No axillary adenopathy.      Left upper body: No axillary adenopathy.   Neurological:      Mental Status: She is alert and oriented to person, place, and time.   Psychiatric:         Mood and Affect: Mood normal.         Behavior: Behavior normal.         "

## 2024-07-26 NOTE — ASSESSMENT & PLAN NOTE
Continues on OCPs from our office and Metformin from endocrinology.  12/2023 HgA1c normal.  Cholesterol not checked in couple years, h/o elevated in past.  Order provided.

## 2024-07-26 NOTE — LETTER
2024     Vera Ochoa MD  [address]  [address]  [facility] [address]    Patient: Priscila Templeton   YOB: 2000   Date of Visit: 2024       Dear Dr. Ochoa:    Thank you for referring Priscila Templeton to me for evaluation. Below are my notes for this consultation.    If you have questions, please do not hesitate to call me. I look forward to following your patient along with you.         Sincerely,        Arelis Roque MD        CC: No Recipients    Arelis Roque MD  2024 12:14 PM  Sign when Signing Visit  Annual Wellness Visit  [facility] - [address], [address], [place] [address]    ASSESSMENT/PLAN: Priscila Templeton is a 23 y.o.  who presents for annual gynecologic exam.    Encounter for routine gynecologic examination  - Routine well woman exam completed today.  - Cervical Cancer Screening: Current ASCCP Guidelines reviewed. Last Pap: not done yet.   Next Pap Due: today.  - HPV Vaccination status: Immunization series complete  - STI screening offered including HIV testing: declined, has not been sexually active  - Contraceptive counseling discussed.  Current contraception: oral contraceptives (estrogen/progesterone), for PCOS  - The following were reviewed in today's visit: use and side effects of OCPs, exercise, and healthy diet    Additional problems addressed during this visit:  1. Encounter for annual routine gynecological examination  2. Cervical cancer screening  -     Thinprep Tis Pap Reflex HPV mRNA E6/E7  3. PCOS (polycystic ovarian syndrome)  Assessment & Plan:  Continues on OCPs from our office and Metformin from endocrinology.  2023 HgA1c normal.  Cholesterol not checked in couple years, h/o elevated in past.  Order provided.  Orders:  -     Lipid Panel with Direct LDL reflex; Future  -     Lipid Panel with Direct LDL reflex  -     ABO/Rh; Future  -     ABO/Rh  -     norethindrone-ethinyl estradiol-iron (Ariella WAGNER  1.) 1.5-30 MG-MCG tablet; Take 1 tablet by mouth daily  4. History of hypercholesterolemia  Assessment & Plan:   elevated triglycerides.  Not checked since then.   2024 order provided  Orders:  -     Lipid Panel with Direct LDL reflex; Future  -     Lipid Panel with Direct LDL reflex  5. Pelvic cramping  Comments:  cramping between periods. Ordered transabdominal and transvaginal u/s - pt aware she can declined transvaginal if she is uncomfortable but may be less accurate.  Orders:  -     US pelvis complete w transvaginal; Future      Next visit: 1 year Wellness      CC:  Annual Gynecologic Examination    HPI: Priscila Templeton is a 23 y.o.  who presents for annual gynecologic examination.  She denies any breast, urinary or pelvic issues at today's visit.    On OCPs for PCOS.  Getting monthly periods on withdrawal week.  Has not been sexually active in the past.  C/o period like cramps (but lighter) but not during period - for last few months.  About daily.  Not associated with bowel or bladder function.      Extremely nervous today about pelvic exam and pap smear.  Patient's mother with her in room providing support.        Gyn History  Patient's last menstrual period was 2024.     Last Pap: not done yet    She  reports never being sexually active.       OB History      Past Medical History:  No date: Anxiety      Comment:  sees psychiatrist and therapy; on medication; severe                anxiety about health care and blood draws  No date: Eczema  No date: Hyperlipidemia      Comment:  elevated trigylcerides 2019 and 2020.  Recommend                follow up  w/ pediatrican.  : Migraine      Comment:  no auras  No date: Morbid obesity (HCC)  No date: PCOS (polycystic ovarian syndrome)  No date: Prediabetes      Comment:  seeing endocrinology  2023: Sucrose intolerance      Comment:  diagnosed by GI after years of chronic diarrhea  No date: Vitamin D deficiency     Past  Surgical History:  No date: WISDOM TOOTH EXTRACTION     Family History   Problem Relation Age of Onset   • Colon cancer Maternal Grandfather    • Heart disease Maternal Grandfather    • Brain cancer Paternal Grandmother    • Miscarriages / Stillbirths Paternal Grandmother    • Migraines Mother    • Rashes / Skin problems Mother    • Cancer Maternal Grandmother    • Miscarriages / Stillbirths Maternal Grandmother    • Osteoporosis Maternal Grandmother         Social History     Tobacco Use   • Smoking status: Never     Passive exposure: Never   • Smokeless tobacco: Never   Vaping Use   • Vaping status: Never Used   Substance Use Topics   • Alcohol use: Yes     Comment: occasional   • Drug use: Never          Current Outpatient Medications:   •  atoMOXetine (STRATTERA) 40 mg capsule, Take 50 mg by mouth daily, Disp: , Rfl:   •  betamethasone dipropionate (DIPROSONE) 0.05 % cream, Apply 1 application topically 2 (two) times a day, Disp: , Rfl:   •  busPIRone (BUSPAR) 15 mg tablet, Take 15 mg by mouth 2 (two) times a day, Disp: , Rfl:   •  cetirizine (ZyrTEC) 10 mg tablet, Take 10 mg by mouth daily, Disp: , Rfl:   •  cholecalciferol (VITAMIN D3) 1,000 units tablet, Take 1,000 Units by mouth daily, Disp: , Rfl:   •  clindamycin (CLINDAGEL) 1 % gel, Apply topically daily, Disp: , Rfl:   •  Clobetasol Prop Emollient Base 0.05 % emollient cream, Apply topically, Disp: , Rfl:   •  dicyclomine (BENTYL) 10 mg capsule, Take 20 mg by mouth daily as needed, Disp: , Rfl:   •  escitalopram (LEXAPRO) 20 mg tablet, Take 10 mg by mouth daily, Disp: , Rfl:   •  hydrOXYzine HCL (ATARAX) 10 mg tablet, Take 10 mg by mouth daily as needed, Disp: , Rfl:   •  loperamide (IMODIUM) 2 mg capsule, Take 1 mg by mouth if needed, Disp: , Rfl:   •  Melatonin 5 MG TABS, Take 5 mg by mouth, Disp: , Rfl:   •  metFORMIN (GLUCOPHAGE-XR) 500 mg 24 hr tablet, Take 500 mg by mouth 2 (two) times a day, Disp: , Rfl:   •  midodrine (PROAMATINE) 2.5 mg tablet,  "Take 2.5 mg by mouth 3 (three) times a day before meals, Disp: , Rfl:   •  norethindrone-ethinyl estradiol-iron (Ariella FE 1.5/30) 1.5-30 MG-MCG tablet, Take 1 tablet by mouth daily, Disp: 84 tablet, Rfl: 2  •  Riboflavin (VITAMIN B2 PO), Take 200 mg by mouth in the morning, Disp: , Rfl:   •  Sacrosidase (SUCRAID PO), Take by mouth, Disp: , Rfl:   •  sodium chloride (OCEAN) 0.65 % nasal spray, 1 spray into each nostril, Disp: , Rfl:     She is allergic to other, lactose - food allergy, norethin ace-eth estrad-fe, sucrase, sulfa antibiotics, wild cherry syrup - food allergy, amoxicillin, hyoscyamine, and insect extract..    ROS negative except as noted in HPI    Objective:  /84 (BP Location: Left arm, Patient Position: Sitting, Cuff Size: Standard)   Ht 5' 7.75\" (1.721 m)   Wt (!) 137 kg (303 lb)   LMP 07/22/2024   BMI 46.41 kg/m²      Physical Exam  Constitutional:       Appearance: Normal appearance.   Chest:   Breasts:     Right: Normal. No mass or tenderness.      Left: Normal. No mass or tenderness.   Abdominal:      Palpations: Abdomen is soft.      Tenderness: There is no abdominal tenderness.   Genitourinary:     General: Normal vulva.      Vagina: No bleeding or lesions.      Cervix: Normal.      Uterus: Normal. Not tender.       Adnexa:         Right: No mass or tenderness.          Left: No mass or tenderness.        Rectum: No external hemorrhoid.      Comments: Blind pap done due to discomfort with speculum, cervix not seen but palpated normally  Musculoskeletal:         General: Normal range of motion.   Lymphadenopathy:      Upper Body:      Right upper body: No axillary adenopathy.      Left upper body: No axillary adenopathy.   Neurological:      Mental Status: She is alert and oriented to person, place, and time.   Psychiatric:         Mood and Affect: Mood normal.         Behavior: Behavior normal.         "

## 2024-07-26 NOTE — LETTER
2024       No Recipients    Patient: Priscila Templeton   YOB: 2000   Date of Visit: 2024       Dear Dr. Ochoa:    Thank you for referring Priscila Templeton to me for evaluation. Below are my notes for this consultation.    If you have questions, please do not hesitate to call me. I look forward to following your patient along with you.         Sincerely,        Arelis Roque MD        CC:   No Recipients    Arelis Roque MD  2024 12:13 PM  Incomplete  Annual Wellness Visit  [facility] - [address], [address], [place] [address]    ASSESSMENT/PLAN: Priscila Templeton is a 23 y.o.  who presents for annual gynecologic exam.    Encounter for routine gynecologic examination  - Routine well woman exam completed today.  - Cervical Cancer Screening: Current ASCCP Guidelines reviewed. Last Pap: not done yet.   Next Pap Due: today.  - HPV Vaccination status: Immunization series complete  - STI screening offered including HIV testing: declined, has not been sexually active  - Contraceptive counseling discussed.  Current contraception: oral contraceptives (estrogen/progesterone), for PCOS  - The following were reviewed in today's visit: use and side effects of OCPs, exercise, and healthy diet    Additional problems addressed during this visit:  1. Encounter for annual routine gynecological examination  2. Cervical cancer screening  -     Thinprep Tis Pap Reflex HPV mRNA E6/E7  3. PCOS (polycystic ovarian syndrome)  Assessment & Plan:  Continues on OCPs from our office and Metformin from endocrinology.  2023 HgA1c normal.  Cholesterol not checked in couple years, h/o elevated in past.  Order provided.  Orders:  -     Lipid Panel with Direct LDL reflex; Future  -     Lipid Panel with Direct LDL reflex  -     ABO/Rh; Future  -     ABO/Rh  -     norethindrone-ethinyl estradiol-iron (Ariella WANGER ) 1.5-30 MG-MCG tablet; Take 1 tablet by mouth daily  4.  History of hypercholesterolemia  Assessment & Plan:   elevated triglycerides.  Not checked since then.   2024 order provided  Orders:  -     Lipid Panel with Direct LDL reflex; Future  -     Lipid Panel with Direct LDL reflex  5. Pelvic cramping  Comments:  cramping between periods. Ordered transabdominal and transvaginal u/s - pt aware she can declined transvaginal if she is uncomfortable but may be less accurate.  Orders:  -     US pelvis complete w transvaginal; Future      Next visit: 1 year Wellness      CC:  Annual Gynecologic Examination    HPI: Priscila Templeton is a 23 y.o.  who presents for annual gynecologic examination.  She denies any breast, urinary or pelvic issues at today's visit.    On OCPs for PCOS.  Getting monthly periods on withdrawal week.  Has not been sexually active in the past.  C/o period like cramps (but lighter) but not during period - for last few months.  About daily.  Not associated with bowel or bladder function.      Extremely nervous today about pelvic exam and pap smear.  Patient's mother with her in room providing support.        Gyn History  Patient's last menstrual period was 2024.     Last Pap: not done yet    She  reports never being sexually active.       OB History      Past Medical History:  No date: Anxiety      Comment:  sees psychiatrist and therapy; on medication; severe                anxiety about health care and blood draws  No date: Eczema  No date: Hyperlipidemia      Comment:  elevated trigylcerides 2019 and 2020.  Recommend                follow up  w/ pediatrican.  2010: Migraine      Comment:  no auras  No date: Morbid obesity (HCC)  No date: PCOS (polycystic ovarian syndrome)  No date: Prediabetes      Comment:  seeing endocrinology  2023: Sucrose intolerance      Comment:  diagnosed by GI after years of chronic diarrhea  No date: Vitamin D deficiency     Past Surgical History:  No date: WISDOM TOOTH EXTRACTION     Family  History   Problem Relation Age of Onset   • Colon cancer Maternal Grandfather    • Heart disease Maternal Grandfather    • Brain cancer Paternal Grandmother    • Miscarriages / Stillbirths Paternal Grandmother    • Migraines Mother    • Rashes / Skin problems Mother    • Cancer Maternal Grandmother    • Miscarriages / Stillbirths Maternal Grandmother    • Osteoporosis Maternal Grandmother         Social History     Tobacco Use   • Smoking status: Never     Passive exposure: Never   • Smokeless tobacco: Never   Vaping Use   • Vaping status: Never Used   Substance Use Topics   • Alcohol use: Yes     Comment: occasional   • Drug use: Never          Current Outpatient Medications:   •  atoMOXetine (STRATTERA) 40 mg capsule, Take 50 mg by mouth daily, Disp: , Rfl:   •  betamethasone dipropionate (DIPROSONE) 0.05 % cream, Apply 1 application topically 2 (two) times a day, Disp: , Rfl:   •  busPIRone (BUSPAR) 15 mg tablet, Take 15 mg by mouth 2 (two) times a day, Disp: , Rfl:   •  cetirizine (ZyrTEC) 10 mg tablet, Take 10 mg by mouth daily, Disp: , Rfl:   •  cholecalciferol (VITAMIN D3) 1,000 units tablet, Take 1,000 Units by mouth daily, Disp: , Rfl:   •  clindamycin (CLINDAGEL) 1 % gel, Apply topically daily, Disp: , Rfl:   •  Clobetasol Prop Emollient Base 0.05 % emollient cream, Apply topically, Disp: , Rfl:   •  dicyclomine (BENTYL) 10 mg capsule, Take 20 mg by mouth daily as needed, Disp: , Rfl:   •  escitalopram (LEXAPRO) 20 mg tablet, Take 10 mg by mouth daily, Disp: , Rfl:   •  hydrOXYzine HCL (ATARAX) 10 mg tablet, Take 10 mg by mouth daily as needed, Disp: , Rfl:   •  loperamide (IMODIUM) 2 mg capsule, Take 1 mg by mouth if needed, Disp: , Rfl:   •  Melatonin 5 MG TABS, Take 5 mg by mouth, Disp: , Rfl:   •  metFORMIN (GLUCOPHAGE-XR) 500 mg 24 hr tablet, Take 500 mg by mouth 2 (two) times a day, Disp: , Rfl:   •  midodrine (PROAMATINE) 2.5 mg tablet, Take 2.5 mg by mouth 3 (three) times a day before meals, Disp:  ", Rfl:   •  norethindrone-ethinyl estradiol-iron (Ariella FE 1.5/30) 1.5-30 MG-MCG tablet, Take 1 tablet by mouth daily, Disp: 84 tablet, Rfl: 2  •  Riboflavin (VITAMIN B2 PO), Take 200 mg by mouth in the morning, Disp: , Rfl:   •  Sacrosidase (SUCRAID PO), Take by mouth, Disp: , Rfl:   •  sodium chloride (OCEAN) 0.65 % nasal spray, 1 spray into each nostril, Disp: , Rfl:     She is allergic to other, lactose - food allergy, norethin ace-eth estrad-fe, sucrase, sulfa antibiotics, wild cherry syrup - food allergy, amoxicillin, hyoscyamine, and insect extract..    ROS negative except as noted in HPI    Objective:  /84 (BP Location: Left arm, Patient Position: Sitting, Cuff Size: Standard)   Ht 5' 7.75\" (1.721 m)   Wt (!) 137 kg (303 lb)   LMP 07/22/2024   BMI 46.41 kg/m²      Physical Exam  Constitutional:       Appearance: Normal appearance.   Chest:   Breasts:     Right: Normal. No mass or tenderness.      Left: Normal. No mass or tenderness.   Abdominal:      Palpations: Abdomen is soft.      Tenderness: There is no abdominal tenderness.   Genitourinary:     General: Normal vulva.      Vagina: No bleeding or lesions.      Cervix: Normal.      Uterus: Normal. Not tender.       Adnexa:         Right: No mass or tenderness.          Left: No mass or tenderness.        Rectum: No external hemorrhoid.      Comments: Blind pap done due to discomfort with speculum, cervix not seen but palpated normally  Musculoskeletal:         General: Normal range of motion.   Lymphadenopathy:      Upper Body:      Right upper body: No axillary adenopathy.      Left upper body: No axillary adenopathy.   Neurological:      Mental Status: She is alert and oriented to person, place, and time.   Psychiatric:         Mood and Affect: Mood normal.         Behavior: Behavior normal.           Arelis Roque MD  7/26/2024  9:59 AM  Sign when Signing Visit  Annual Wellness Visit  [facility] - [address], [address], [place] [address]    ASSESSMENT/PLAN: Priscila Templeton is a 23 y.o.  who presents for annual gynecologic exam.    Encounter for routine gynecologic examination  - Routine well woman exam completed today.  - Cervical Cancer Screening: Current ASCCP Guidelines reviewed. Last Pap: not done yet.   Next Pap Due: ***.  - HPV Vaccination status: Immunization series complete  - STI screening offered including HIV testing: {stitestin}  - Contraceptive counseling discussed.  Current contraception: {Contraception:44127}  - The following were reviewed in today's visit: {Gyn counselin}    Additional problems addressed during this visit:  1. Encounter for annual routine gynecological examination  2. Cervical cancer screening  3. PCOS (polycystic ovarian syndrome)  4. Insulin resistance      Next visit: ***      CC:  Annual Gynecologic Examination    HPI: Priscila Templeton is a 23 y.o.  who presents for annual gynecologic examination.  She denies any breast, urinary or pelvic issues at today's visit.    On OCPs for PCOS.  Getting monthly periods on withdrawal week.  Has not been sexually active in the past.  C/o period like cramps (but lighter) but not during period - for last few months.  About daily.  Not associated with bowel or bladder function.          Gyn History  Patient's last menstrual period was 2024.     Last Pap: Not on file {findings; last pap:03672}    She  reports never being sexually active.       OB History      Past Medical History:  No date: Anxiety      Comment:  sees psychiatrist and therapy; on medication; severe                anxiety about health care and blood draws  No date: Chronic diarrhea      Comment:  follows GI  No date: Eczema  No date: Hyperlipidemia      Comment:  elevated trigylcerides 2019 and 2020.  Recommend                follow up  w/ pediatrican.  2010: Migraine      Comment:  no auras  No date: Morbid obesity (HCC)  No date: PCOS  (polycystic ovarian syndrome)  No date: Prediabetes      Comment:  seeing endocrinology  07/2023: Sucrose intolerance      Comment:  diagnosed by GI after years of chronic diarrhea  No date: Sucrose intolerance  No date: Vitamin D deficiency     Past Surgical History:  No date: WISDOM TOOTH EXTRACTION     Family History   Problem Relation Age of Onset   • Colon cancer Maternal Grandfather    • Heart disease Maternal Grandfather    • Brain cancer Paternal Grandmother    • Miscarriages / Stillbirths Paternal Grandmother    • Migraines Mother    • Rashes / Skin problems Mother    • Cancer Maternal Grandmother    • Miscarriages / Stillbirths Maternal Grandmother    • Osteoporosis Maternal Grandmother         Social History     Tobacco Use   • Smoking status: Never     Passive exposure: Never   • Smokeless tobacco: Never   Vaping Use   • Vaping status: Never Used   Substance Use Topics   • Alcohol use: Yes     Comment: occasional   • Drug use: Never          Current Outpatient Medications:   •  atoMOXetine (STRATTERA) 40 mg capsule, Take 50 mg by mouth daily, Disp: , Rfl:   •  betamethasone dipropionate (DIPROSONE) 0.05 % cream, Apply 1 application topically 2 (two) times a day, Disp: , Rfl:   •  busPIRone (BUSPAR) 15 mg tablet, Take 15 mg by mouth 2 (two) times a day, Disp: , Rfl:   •  cholecalciferol (VITAMIN D3) 1,000 units tablet, Take 1,000 Units by mouth daily, Disp: , Rfl:   •  clindamycin (CLINDAGEL) 1 % gel, Apply topically daily, Disp: , Rfl:   •  dicyclomine (BENTYL) 10 mg capsule, Take 20 mg by mouth daily as needed, Disp: , Rfl:   •  escitalopram (LEXAPRO) 20 mg tablet, Take 10 mg by mouth daily, Disp: , Rfl:   •  loperamide (IMODIUM) 2 mg capsule, Take 1 mg by mouth if needed, Disp: , Rfl:   •  Melatonin 5 MG TABS, Take 5 mg by mouth, Disp: , Rfl:   •  metFORMIN (GLUCOPHAGE-XR) 500 mg 24 hr tablet, Take 500 mg by mouth 2 (two) times a day, Disp: , Rfl:   •  midodrine (PROAMATINE) 2.5 mg tablet, Take 2.5 mg  "by mouth 3 (three) times a day before meals, Disp: , Rfl:   •  norethindrone-ethinyl estradiol-iron (Ariella FE 1.5/30) 1.5-30 MG-MCG tablet, Take 1 tablet by mouth daily, Disp: 84 tablet, Rfl: 2  •  Riboflavin (VITAMIN B2 PO), Take 200 mg by mouth in the morning, Disp: , Rfl:   •  sodium chloride (OCEAN) 0.65 % nasal spray, 1 spray into each nostril, Disp: , Rfl:   •  albuterol (PROVENTIL HFA,VENTOLIN HFA) 90 mcg/act inhaler, [id number] 60 ACTUAT albuterol 0.09 MG/ACTUAT Metered Dose Inhaler  completed, Disp: , Rfl:   •  azelastine (ASTELIN) 0.1 % nasal spray, 1 spray into each nostril 2 (two) times a day as needed, Disp: , Rfl:   •  cetirizine (ZyrTEC) 10 mg tablet, Take 10 mg by mouth daily, Disp: , Rfl:   •  Clobetasol Prop Emollient Base 0.05 % emollient cream, Apply topically, Disp: , Rfl:   •  hydrOXYzine HCL (ATARAX) 10 mg tablet, Take 10 mg by mouth daily as needed, Disp: , Rfl:   •  ipratropium (ATROVENT) 0.03 % nasal spray, 2 sprays into each nostril every 12 (twelve) hours, Disp: , Rfl:   •  Sacrosidase (SUCRAID PO), Take by mouth, Disp: , Rfl:     She is allergic to other, lactose - food allergy, norethin ace-eth estrad-fe, sucrase, sulfa antibiotics, wild cherry syrup - food allergy, amoxicillin, hyoscyamine, and insect extract..    ROS negative except as noted in HPI    Objective:  /84 (BP Location: Left arm, Patient Position: Sitting, Cuff Size: Standard)   Ht 5' 7.75\" (1.721 m)   Wt (!) 137 kg (303 lb)   LMP 07/22/2024   BMI 46.41 kg/m²      Physical Exam  Constitutional:       Appearance: Normal appearance.   Chest:   Breasts:     Right: Normal. No mass or tenderness.      Left: Normal. No mass or tenderness.   Abdominal:      Palpations: Abdomen is soft.      Tenderness: There is no abdominal tenderness.   Genitourinary:     General: Normal vulva.      Vagina: No bleeding or lesions.      Cervix: Normal.      Uterus: Normal. Not tender.       Adnexa:         Right: No mass or tenderness.   "        Left: No mass or tenderness.        Rectum: No external hemorrhoid.      Comments: Blind pap done due to discomfort with speculum, cervix not seen but palpated normally  Musculoskeletal:         General: Normal range of motion.   Lymphadenopathy:      Upper Body:      Right upper body: No axillary adenopathy.      Left upper body: No axillary adenopathy.   Neurological:      Mental Status: She is alert and oriented to person, place, and time.   Psychiatric:         Mood and Affect: Mood normal.         Behavior: Behavior normal.

## 2024-07-31 LAB
CLINICAL INFO: NORMAL
CYTO CVX: NORMAL
CYTOLOGY CMNT CVX/VAG CYTO-IMP: NORMAL
DATE PREVIOUS BX: NORMAL
LMP START DATE: NORMAL
SL AMB PREV. PAP:: NORMAL
SPECIMEN SOURCE CVX/VAG CYTO: NORMAL

## 2024-08-23 ENCOUNTER — TELEPHONE (OUTPATIENT)
Age: 24
End: 2024-08-23

## 2024-09-16 DIAGNOSIS — E28.2 PCOS (POLYCYSTIC OVARIAN SYNDROME): ICD-10-CM

## 2024-09-17 RX ORDER — NORETHINDRONE ACETATE AND ETHINYL ESTRADIOL AND FERROUS FUMARATE 1.5-30(21)
1 KIT ORAL DAILY
Qty: 84 TABLET | Refills: 1 | Status: SHIPPED | OUTPATIENT
Start: 2024-09-17

## 2024-11-04 ENCOUNTER — TELEPHONE (OUTPATIENT)
Age: 24
End: 2024-11-04

## 2024-11-04 NOTE — TELEPHONE ENCOUNTER
Patient called in stating she just seen a neurologist for the first time and was advised that she should not be on the contraceptive she is on because she would be high risk for a stroke. Patient started seeing neuro due to blinding migraines. Asked patient about possible aura, states she does lose vision behind the eye she has the migraine on at times. Scheduled for eval tomorrow to discuss OCP and migraines with possible aura. Address confirmed.

## 2024-11-05 ENCOUNTER — OFFICE VISIT (OUTPATIENT)
Dept: OBGYN CLINIC | Facility: CLINIC | Age: 24
End: 2024-11-05
Payer: COMMERCIAL

## 2024-11-05 VITALS
SYSTOLIC BLOOD PRESSURE: 118 MMHG | HEIGHT: 68 IN | WEIGHT: 293 LBS | BODY MASS INDEX: 44.41 KG/M2 | DIASTOLIC BLOOD PRESSURE: 78 MMHG

## 2024-11-05 DIAGNOSIS — E28.2 PCOS (POLYCYSTIC OVARIAN SYNDROME): Primary | ICD-10-CM

## 2024-11-05 PROCEDURE — 99214 OFFICE O/P EST MOD 30 MIN: CPT | Performed by: OBSTETRICS & GYNECOLOGY

## 2024-11-05 RX ORDER — MEDROXYPROGESTERONE ACETATE 10 MG
TABLET ORAL
Qty: 7 TABLET | Refills: 3 | Status: SHIPPED | OUTPATIENT
Start: 2024-11-05

## 2024-11-05 RX ORDER — ATOMOXETINE 10 MG/1
CAPSULE ORAL
COMMUNITY
Start: 2024-10-03

## 2024-11-05 RX ORDER — SODIUM FLUORIDE 6 MG/ML
PASTE, DENTIFRICE DENTAL
COMMUNITY
Start: 2024-09-03

## 2024-11-05 RX ORDER — CALCIUM CARBONATE/VITAMIN D3 500-10/5ML
LIQUID (ML) ORAL
COMMUNITY

## 2024-11-05 RX ORDER — UBROGEPANT 100 MG/1
100 TABLET ORAL
COMMUNITY
Start: 2024-10-31 | End: 2024-11-30

## 2024-11-05 NOTE — PATIENT INSTRUCTIONS
PCOS Management:  Metformin taper regimen:     - Metformin XR 500mg with dinner for 1 week     - Add Meformin XR 500mg with breastfast for 1 week     - Add additional dose at dinner for 1 week     - Add additional dose at breakfast for goal of 1000mg Meformin XR twice a day  Provera to start period:     - While waiting for Metformin to help regulate periods, if you don't have period bleeding 3 months after last period, please take Provera to bring on period.     - take one pill daily for 7 days     - should expect to have period within two weeks after finishing the 7 days, call office if no period.

## 2024-11-05 NOTE — ASSESSMENT & PLAN NOTE
Reviewed new dx of ocular migraines and neurology recommendation to stop OCPs.  I agree as with this history they increase risk stroke risk.  She is not on OCPs for contraception but to regulate periods from her PCOS.    Discussed PCOS diagnosis with patient and mom in detail and goals of management.  Suggested trying metformin to help regulate periods, as this has been shown effective in some patients with PCOS.  Usually this is seen at higher doses than her current 500mg bid dosing.  She is managed by endocrinology on the Metformin.  Discussed increasing dosing can cause GI side effects, which she has had before, but improved with dx of sucrose intolerance and removal of it from her diet.  Priscila is interested in trying to increase metformin to see if can help regulate her periods, she is aware may take up to 6 months to be effective.  She states she was supposed to see endocrinology in Summer but got bumped and does not have f/u until March 2025.  She just filled a refill of her current Metformin from them.    Discussed would recom increase to goal of 1000mg twice a day, by adding doses step wise and following for GI symptoms.  I offered to call her endocrinologist, Lang Lantigua, [facility], to see if he is okay with this and would increase script.  If he feels safe for her but not willing to increase script I can take over ordering.     She will stop OCPs at end of current pack, which is next week.  We reviewed given this can take months to help regulate period - Provera 10mg daily for 1 week to pharmacy with instructions to take if no period after 3 months.  Interested in trying to incrfease metformin.  I will contact [facility]acio.  She has labs from ignacio and neurology and plan to have done.  I will follow up with her once I talk to her endocrinologist.    Orders:    medroxyPROGESTERone (PROVERA) 10 mg tablet; Take 10mg medroxyprogesterone daily for 7 days to bring on period, if no period in 3  months.

## 2024-11-05 NOTE — PROGRESS NOTES
[facility] - [address], [address], [place] [address]    Assessment & Plan  PCOS (polycystic ovarian syndrome)  Reviewed new dx of ocular migraines and neurology recommendation to stop OCPs.  I agree as with this history they increase risk stroke risk.  She is not on OCPs for contraception but to regulate periods from her PCOS.    Discussed PCOS diagnosis with patient and mom in detail and goals of management.  Suggested trying metformin to help regulate periods, as this has been shown effective in some patients with PCOS.  Usually this is seen at higher doses than her current 500mg bid dosing.  She is managed by endocrinology on the Metformin.  Discussed increasing dosing can cause GI side effects, which she has had before, but improved with dx of sucrose intolerance and removal of it from her diet.  Priscila is interested in trying to increase metformin to see if can help regulate her periods, she is aware may take up to 6 months to be effective.  She states she was supposed to see endocrinology in Summer but got bumped and does not have f/u until March 2025.  She just filled a refill of her current Metformin from them.    Discussed would recom increase to goal of 1000mg twice a day, by adding doses step wise and following for GI symptoms.  I offered to call her endocrinologist, Lang Lantigua, [facility], to see if he is okay with this and would increase script.  If he feels safe for her but not willing to increase script I can take over ordering.     She will stop OCPs at end of current pack, which is next week.  We reviewed given this can take months to help regulate period - Provera 10mg daily for 1 week to pharmacy with instructions to take if no period after 3 months.  Interested in trying to incrfease metformin.  I will contact ignacio.  She has labs from endo and neurology and plan to have done.  I will follow up with her once I talk to her endocrinologist.    Orders:     medroxyPROGESTERone (PROVERA) 10 mg tablet; Take 10mg medroxyprogesterone daily for 7 days to bring on period, if no period in 3 months.      I have spent a total time of 30 minutes in caring for this patient on the day of the visit/encounter including Diagnostic results, Prognosis, Risks and benefits of tx options, Patient and family education, Risk factor reductions, Impressions, Counseling / Coordination of care, Reviewing / ordering tests, medicine, procedures  , and Obtaining or reviewing history  .      Subjective:   Priscila Templeton is a 23 y.o.  female.    HPI:   Priscila presents with her mother today to discuss risks of OCPs.    She has a history of migraines in the past.  She saw neurologist for the first time and was diagnosed with ocular migraines and told she should not be on estrogen containing birth control.  She is not sexually active, but has been on OCPs since  for PCOS to regulate periods.  She was seen by neurology recently for headaches/migraines.  She had previously reported no auras.  Neurology states she has ocular migraines and recommends stopping OCPs    Initially she tried Metformin to regulate periods but had GI side effects which limited dosing, she then went to OCPs.  She is on Metformin and seeing endocrinology.  She is currently on Metformin ER 500mg bid and doing well.  She was dx with Sucrose intolerance in  and eliminating that from diet helped GI distress.      Currently her Metformin is prescribed by endocrinologist Anyi Ortiz, [facility], for Pre-diabetes.  Last HgA1C was  and normal (is in [facility]).  She had orders from endocrinology to repeat.    States she was to see endocrinology this summer () but was bumped.  Next appt is in March although they are trying to fit her in in Thanksgiving when she is home from school is there is a cancellation.  She is picking up Metformin script from pharmacy today with 500mg bid  dosing.        Gyn History  No LMP recorded.       Last pap smear: 2024    She  reports never being sexually active.       OB History      Past Medical History:  No date: Anxiety      Comment:  sees psychiatrist and therapy; on medication; severe                anxiety about health care and blood draws  No date: Eczema  No date: Hyperlipidemia      Comment:  elevated trigylcerides 2019 and 2020.  Recommend                follow up  w/ pediatrican.  2010: Migraine      Comment:  no auras  No date: Morbid obesity (HCC)  No date: PCOS (polycystic ovarian syndrome)  No date: Prediabetes      Comment:  seeing endocrinology  2023: Sucrose intolerance      Comment:  diagnosed by GI after years of chronic diarrhea  No date: Vitamin D deficiency     Past Surgical History:  No date: WISDOM TOOTH EXTRACTION     Social History     Tobacco Use    Smoking status: Never     Passive exposure: Never    Smokeless tobacco: Never   Vaping Use    Vaping status: Never Used   Substance Use Topics    Alcohol use: Yes     Comment: occasional    Drug use: Never          Current Outpatient Medications:     atoMOXetine (STRATTERA) 40 mg capsule, Take 50 mg by mouth daily, Disp: , Rfl:     betamethasone dipropionate (DIPROSONE) 0.05 % cream, Apply 1 application topically 2 (two) times a day, Disp: , Rfl:     busPIRone (BUSPAR) 15 mg tablet, Take 15 mg by mouth 2 (two) times a day, Disp: , Rfl:     cetirizine (ZyrTEC) 10 mg tablet, Take 10 mg by mouth daily, Disp: , Rfl:     cholecalciferol (VITAMIN D3) 1,000 units tablet, Take 1,000 Units by mouth daily, Disp: , Rfl:     clindamycin (CLINDAGEL) 1 % gel, Apply topically daily, Disp: , Rfl:     Clobetasol Prop Emollient Base 0.05 % emollient cream, Apply topically, Disp: , Rfl:     dicyclomine (BENTYL) 10 mg capsule, Take 20 mg by mouth daily as needed, Disp: , Rfl:     escitalopram (LEXAPRO) 20 mg tablet, Take 10 mg by mouth daily, Disp: , Rfl:     hydrOXYzine HCL (ATARAX) 10  mg tablet, Take 10 mg by mouth daily as needed, Disp: , Rfl:     loperamide (IMODIUM) 2 mg capsule, Take 1 mg by mouth if needed, Disp: , Rfl:     Melatonin 5 MG TABS, Take 5 mg by mouth, Disp: , Rfl:     metFORMIN (GLUCOPHAGE-XR) 500 mg 24 hr tablet, Take 500 mg by mouth 2 (two) times a day, Disp: , Rfl:     midodrine (PROAMATINE) 2.5 mg tablet, Take 2.5 mg by mouth 3 (three) times a day before meals, Disp: , Rfl:     norethindrone-ethinyl estradiol-iron (Ariella FE 1.5/30) 1.5-30 MG-MCG tablet, TAKE 1 TABLET BY MOUTH EVERY DAY, Disp: 84 tablet, Rfl: 1    Riboflavin (VITAMIN B2 PO), Take 200 mg by mouth in the morning, Disp: , Rfl:     Sacrosidase (SUCRAID PO), Take by mouth, Disp: , Rfl:     sodium chloride (OCEAN) 0.65 % nasal spray, 1 spray into each nostril, Disp: , Rfl:     She is allergic to other, lactose - food allergy, norethin ace-eth estrad-fe, sucrase, sulfa antibiotics, wild cherry syrup - food allergy, amoxicillin, hyoscyamine, and insect extract..    ROS: Review of Systems   Constitutional: Negative.    Gastrointestinal: Negative.    Genitourinary: Negative.    Psychiatric/Behavioral: Negative.         Objective:  There were no vitals taken for this visit.     Physical Exam  Constitutional:       Appearance: Normal appearance.   Neurological:      Mental Status: She is alert and oriented to person, place, and time.   Psychiatric:         Behavior: Behavior normal.

## 2024-11-19 ENCOUNTER — TELEPHONE (OUTPATIENT)
Dept: OBGYN CLINIC | Facility: CLINIC | Age: 24
End: 2024-11-19

## 2024-11-19 NOTE — TELEPHONE ENCOUNTER
Called office of Dr. Lang Lantigua [facility] [place] ph: [phone], re increasing Priscila's Metformin for period regulation since she had to stop OCPs for ocular migraine.   Left msg with office staff for him to return my call to discuss.

## 2024-11-26 ENCOUNTER — RESULTS FOLLOW-UP (OUTPATIENT)
Dept: OBGYN CLINIC | Facility: CLINIC | Age: 24
End: 2024-11-26

## 2024-11-26 LAB
ABO GROUP BLD: NORMAL
CHOLEST SERPL-MCNC: 134 MG/DL
CHOLEST/HDLC SERPL: 2.7 (CALC)
HDLC SERPL-MCNC: 50 MG/DL
LDLC SERPL CALC-MCNC: 60 MG/DL (CALC)
NONHDLC SERPL-MCNC: 84 MG/DL (CALC)
RH BLD: NORMAL
TRIGL SERPL-MCNC: 160 MG/DL